# Patient Record
Sex: FEMALE | Race: WHITE | NOT HISPANIC OR LATINO | Employment: FULL TIME | URBAN - NONMETROPOLITAN AREA
[De-identification: names, ages, dates, MRNs, and addresses within clinical notes are randomized per-mention and may not be internally consistent; named-entity substitution may affect disease eponyms.]

---

## 2021-01-19 DIAGNOSIS — D25.9 LEIOMYOMA OF UTERUS, UNSPECIFIED: ICD-10-CM

## 2021-01-19 DIAGNOSIS — N95.0 POSTMENOPAUSAL BLEEDING: ICD-10-CM

## 2021-01-20 ENCOUNTER — HOSPITAL ENCOUNTER (OUTPATIENT)
Dept: ULTRASOUND IMAGING | Facility: HOSPITAL | Age: 66
Discharge: HOME | End: 2021-01-20
Payer: MEDICARE

## 2021-01-20 DIAGNOSIS — N95.0 POSTMENOPAUSAL BLEEDING: ICD-10-CM

## 2021-01-20 PROCEDURE — 76830 TRANSVAGINAL US NON-OB: CPT

## 2021-01-20 PROCEDURE — 76856 US EXAM PELVIC COMPLETE: CPT

## 2021-01-26 ENCOUNTER — HOSPITAL ENCOUNTER (OUTPATIENT)
Dept: MRI IMAGING | Facility: HOSPITAL | Age: 66
Discharge: HOME | End: 2021-01-26
Payer: MEDICARE

## 2021-01-26 DIAGNOSIS — D25.9 LEIOMYOMA OF UTERUS, UNSPECIFIED: ICD-10-CM

## 2021-01-26 PROCEDURE — A9585 GADOBUTROL INJECTION: HCPCS | Performed by: RADIOLOGY

## 2021-01-26 PROCEDURE — 72197 MRI PELVIS W/O & W/DYE: CPT

## 2021-01-26 RX ADMIN — GADOBUTROL 6 ML: 604.72 INJECTION INTRAVENOUS at 10:14

## 2021-02-03 ENCOUNTER — TELEMEDICINE (OUTPATIENT)
Dept: GYNECOLOGIC ONCOLOGY | Facility: CLINIC | Age: 66
End: 2021-02-03
Payer: MEDICARE

## 2021-02-03 DIAGNOSIS — N83.8 MASS, OVARIAN: Primary | ICD-10-CM

## 2021-02-03 PROBLEM — F41.9 ANXIETY: Status: ACTIVE | Noted: 2021-02-03

## 2021-02-03 PROCEDURE — 99204 OFFICE O/P NEW MOD 45 MIN: CPT | Performed by: OBSTETRICS & GYNECOLOGY

## 2021-02-03 RX ORDER — HEPARIN SODIUM 5000 [USP'U]/ML
5000 INJECTION, SOLUTION INTRAVENOUS; SUBCUTANEOUS
Status: CANCELLED | OUTPATIENT
Start: 2021-02-04 | End: 2021-02-05

## 2021-02-03 RX ORDER — CEFAZOLIN SODIUM 1 G/50ML
1000 SOLUTION INTRAVENOUS ONCE
Status: CANCELLED | OUTPATIENT
Start: 2021-02-03 | End: 2021-02-03

## 2021-02-03 RX ORDER — CELECOXIB 200 MG/1
200 CAPSULE ORAL ONCE
Status: CANCELLED | OUTPATIENT
Start: 2021-02-03 | End: 2021-02-03

## 2021-02-03 RX ORDER — ALPRAZOLAM 0.5 MG/1
TABLET ORAL
COMMUNITY

## 2021-02-03 RX ORDER — ACETAMINOPHEN 325 MG/1
975 TABLET ORAL ONCE
Status: CANCELLED | OUTPATIENT
Start: 2021-02-03 | End: 2021-02-03

## 2021-02-03 RX ORDER — ESCITALOPRAM OXALATE 10 MG/1
10 TABLET ORAL DAILY
COMMUNITY

## 2021-02-03 NOTE — LETTER
February 3, 2021     Ruddy Cantor, [address] [place] [address]    Patient: Chasity Norris   YOB: 1955   Date of Visit: 2/3/2021       Dear Dr Yulissa Sebastian: Thank you for referring Chasity Norris to me for evaluation  Below are my notes for this consultation  If you have questions, please do not hesitate to call me  I look forward to following your patient along with you           Sincerely,        Zaida Cooley MD        CC: Lizbeth Gracia MD

## 2021-02-03 NOTE — ASSESSMENT & PLAN NOTE
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, favor fibroma/thecoma  I discussed with patient differential diagnosis including benign, borderline malignant pathology  We discussed potential management options including observation versus definitive surgical treatment  Patient wants to proceed with surgery  I counseled her and recommended proceeding with laparoscopic bilateral salpingo-oophorectomy, possible hysterectomy, laparotomy, staging, tumor debulking and all other indicated procedures  She verbally consented and wants to proceed  Her informed consent forms will be signed as hard copies on the day of the procedure as today's visit was performed via telemedicine  Will obtain preoperative testing as per procedure pass  The patient was counseled regarding indications, risks, benefits and alternatives to surgical management  We discussed risks including but not limited to bleeding and potential need for blood transfusions, infection, pain, injury to surrounding organs such as bladder, intestines, ureters and neurovascular structures  Patient understands potential risks associated with stress of surgery and general anesthesia including but not limited to acute cardiac events, venous thromboembolism, etc   All questions answered to patient's satisfaction  Patient agrees and wants to proceed

## 2021-02-03 NOTE — H&P (VIEW-ONLY)
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, favor fibroma/thecoma  I discussed with patient differential diagnosis including benign, borderline malignant pathology  We discussed potential management options including observation versus definitive surgical treatment  Patient wants to proceed with surgery  I counseled her and recommended proceeding with laparoscopic bilateral salpingo-oophorectomy, possible hysterectomy, laparotomy, staging, tumor debulking and all other indicated procedures  She verbally consented and wants to proceed  Her informed consent forms will be signed as hard copies on the day of the procedure as today's visit was performed via telemedicine  Will obtain preoperative testing as per procedure pass  The patient was counseled regarding indications, risks, benefits and alternatives to surgical management  We discussed risks including but not limited to bleeding and potential need for blood transfusions, infection, pain, injury to surrounding organs such as bladder, intestines, ureters and neurovascular structures  Patient understands potential risks associated with stress of surgery and general anesthesia including but not limited to acute cardiac events, venous thromboembolism, etc   All questions answered to patient's satisfaction  Patient agrees and wants to proceed             Relevant Orders    Case request operating room: LAPAROSCOPIC BILATERAL SALPINGO-OOPHORECTOMY, POSSIBLE HYSTERECTOMY, LAPAROTOMY, STAGING, TUMOR DEBULKING AND ALL OTHER INDICATED PROCEDURES  (Completed)    Basic metabolic panel    CBC and differential    EKG 12 lead    Type and screen                Reason for visit is   Chief Complaint   Patient presents with    Virtual Brief Visit        Encounter provider Trenton Andrade MD    Provider located at [address] [address] [phone]    Recent Visits  No visits were found meeting these conditions  Showing recent visits within past 7 days and meeting all other requirements     Today's Visits  Date Type Provider Dept   21 Telemedicine Trenton Andrade, [address] today's visits and meeting all other requirements     Future Appointments  No visits were found meeting these conditions  Showing future appointments within next 150 days and meeting all other requirements        After connecting through telephone, the patient was identified by name and date of birth  Dread Dumont was informed that this is a telemedicine visit and that the visit is being conducted through telephone  My office door was closed  No one else was in the room  She acknowledged consent and understanding of privacy and security of the platform  The patient has agreed to participate and understands she can discontinue the visit at any time  It was my intent to perform this visit via video technology but the patient was not able to do a video connection so the visit was completed via audio telephone only  Patient is aware this is a billable service  Subjective    Dread Dumont is a 76 y o  female  HPI    14-year-old menopausal female with anxiety disorder, white coat hypertension and insomnia  She reports history of 1 prior  section and remote myomectomy approximately 30 years ago  She was in her usual state of health until 2 weeks ago  At that time, had a single episode of noticing blood clot in the toilet  She doubts this was truly postmenopausal bleeding as she so no blood on toilet paper, no hematuria and no subsequent issues    However, reported this to her primary gynecologic provider will order pelvic ultrasound  This demonstrated a 2 mm thin and normal endometrial stripe  Incidentally, a left pelvic mass measuring approximately 5 cm was identified  This was further evaluated by MRI with imaging characteristics consistent with ovarian neoplastic processes, mostly solid, suggestive of sex cord stromal tumor  I was consulted for evaluation and treatment recommendations  Given patient's concern in interest in having this addressed as soon as possible, we connected today via telemedicine visit  Last mammogram:  1 year ago, reportedly normal     Last colonoscopy: Approximately 10 years ago, reportedly normal     Past Medical History:   Diagnosis Date    Anxiety disorder     Hypertension      reports as "white coat"  hypertension       Past Surgical History:   Procedure Laterality Date     SECTION      MYOMECTOMY         Current Outpatient Medications   Medication Sig Dispense Refill    ALPRAZolam (XANAX) 0 5 mg tablet Take by mouth daily at bedtime as needed for anxiety      escitalopram (LEXAPRO) 10 mg tablet Take 10 mg by mouth daily       No current facility-administered medications for this visit  No Known Allergies    Review of Systems    Denies postmenopausal bleeding  Denies changes in bowel or bladder function  No pain  No other symptoms  Acknowledges significant anxiety  Twelve point review of systems otherwise noncontributory  There were no vitals filed for this visit  2021: MRI pelvis with and without contrast:   Personally reviewed images  The uterus appears slightly enlarged consistent with prior history of fibroids  Endometrial stripe is normal in intensity and thickness  There is an approximately 4-5 cm left adnexal mass in close proximity with the uterus but with clear fat plane  it from it  No separate left ovaries identified    Imaging characteristics suggest mostly solid and  Mostly homogeneous, hypointense on T1 and T2, nonenhancing, consistent with sex cord stromal tumor  I spent 30 minutes directly with the patient during this visit    VIRTUAL VISIT DISCLAIMER    Trae Hills acknowledges that she has consented to an online visit or consultation  She understands that the online visit is based solely on information provided by her, and that, in the absence of a face-to-face physical evaluation by the physician, the diagnosis she receives is both limited and provisional in terms of accuracy and completeness  This is not intended to replace a full medical face-to-face evaluation by the physician  Trae Hills understands and accepts these terms        Yvonne Baker MD, [place], [facility]  2/3/2021  3:43 PM

## 2021-02-03 NOTE — PROGRESS NOTES
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, favor fibroma/thecoma  I discussed with patient differential diagnosis including benign, borderline malignant pathology  We discussed potential management options including observation versus definitive surgical treatment  Patient wants to proceed with surgery  I counseled her and recommended proceeding with laparoscopic bilateral salpingo-oophorectomy, possible hysterectomy, laparotomy, staging, tumor debulking and all other indicated procedures  She verbally consented and wants to proceed  Her informed consent forms will be signed as hard copies on the day of the procedure as today's visit was performed via telemedicine  Will obtain preoperative testing as per procedure pass  The patient was counseled regarding indications, risks, benefits and alternatives to surgical management  We discussed risks including but not limited to bleeding and potential need for blood transfusions, infection, pain, injury to surrounding organs such as bladder, intestines, ureters and neurovascular structures  Patient understands potential risks associated with stress of surgery and general anesthesia including but not limited to acute cardiac events, venous thromboembolism, etc   All questions answered to patient's satisfaction  Patient agrees and wants to proceed             Relevant Orders    Case request operating room: LAPAROSCOPIC BILATERAL SALPINGO-OOPHORECTOMY, POSSIBLE HYSTERECTOMY, LAPAROTOMY, STAGING, TUMOR DEBULKING AND ALL OTHER INDICATED PROCEDURES  (Completed)    Basic metabolic panel    CBC and differential    EKG 12 lead    Type and screen                Reason for visit is   Chief Complaint   Patient presents with    Virtual Brief Visit        Encounter provider Ortiz Rust MD    Provider located at [address] [address] [phone]    Recent Visits  No visits were found meeting these conditions  Showing recent visits within past 7 days and meeting all other requirements     Today's Visits  Date Type Provider Dept   21 Telemedicine Ortiz Rust, [address] today's visits and meeting all other requirements     Future Appointments  No visits were found meeting these conditions  Showing future appointments within next 150 days and meeting all other requirements        After connecting through telephone, the patient was identified by name and date of birth  Popeye Saldana was informed that this is a telemedicine visit and that the visit is being conducted through telephone  My office door was closed  No one else was in the room  She acknowledged consent and understanding of privacy and security of the platform  The patient has agreed to participate and understands she can discontinue the visit at any time  It was my intent to perform this visit via video technology but the patient was not able to do a video connection so the visit was completed via audio telephone only  Patient is aware this is a billable service  Subjective    Popeye Saldana is a 76 y o  female  HPI    68-year-old menopausal female with anxiety disorder, white coat hypertension and insomnia  She reports history of 1 prior  section and remote myomectomy approximately 30 years ago  She was in her usual state of health until 2 weeks ago  At that time, had a single episode of noticing blood clot in the toilet  She doubts this was truly postmenopausal bleeding as she so no blood on toilet paper, no hematuria and no subsequent issues    However, reported this to her primary gynecologic provider will order pelvic ultrasound  This demonstrated a 2 mm thin and normal endometrial stripe  Incidentally, a left pelvic mass measuring approximately 5 cm was identified  This was further evaluated by MRI with imaging characteristics consistent with ovarian neoplastic processes, mostly solid, suggestive of sex cord stromal tumor  I was consulted for evaluation and treatment recommendations  Given patient's concern in interest in having this addressed as soon as possible, we connected today via telemedicine visit  Last mammogram:  1 year ago, reportedly normal     Last colonoscopy: Approximately 10 years ago, reportedly normal     Past Medical History:   Diagnosis Date    Anxiety disorder     Hypertension      reports as "white coat"  hypertension       Past Surgical History:   Procedure Laterality Date     SECTION      MYOMECTOMY         Current Outpatient Medications   Medication Sig Dispense Refill    ALPRAZolam (XANAX) 0 5 mg tablet Take by mouth daily at bedtime as needed for anxiety      escitalopram (LEXAPRO) 10 mg tablet Take 10 mg by mouth daily       No current facility-administered medications for this visit  No Known Allergies    Review of Systems    Denies postmenopausal bleeding  Denies changes in bowel or bladder function  No pain  No other symptoms  Acknowledges significant anxiety  Twelve point review of systems otherwise noncontributory  There were no vitals filed for this visit  2021: MRI pelvis with and without contrast:   Personally reviewed images  The uterus appears slightly enlarged consistent with prior history of fibroids  Endometrial stripe is normal in intensity and thickness  There is an approximately 4-5 cm left adnexal mass in close proximity with the uterus but with clear fat plane  it from it  No separate left ovaries identified    Imaging characteristics suggest mostly solid and  Mostly homogeneous, hypointense on T1 and T2, nonenhancing, consistent with sex cord stromal tumor  I spent 30 minutes directly with the patient during this visit    VIRTUAL VISIT DISCLAIMER    Kirill Esau acknowledges that she has consented to an online visit or consultation  She understands that the online visit is based solely on information provided by her, and that, in the absence of a face-to-face physical evaluation by the physician, the diagnosis she receives is both limited and provisional in terms of accuracy and completeness  This is not intended to replace a full medical face-to-face evaluation by the physician  Kirill Cifuentes understands and accepts these terms        Leila Aviles MD, [place], [facility]  2/3/2021  3:43 PM

## 2021-02-05 ENCOUNTER — OFFICE VISIT (OUTPATIENT)
Dept: LAB | Facility: HOSPITAL | Age: 66
End: 2021-02-05
Attending: OBSTETRICS & GYNECOLOGY
Payer: MEDICARE

## 2021-02-05 DIAGNOSIS — N83.8 MASS, OVARIAN: ICD-10-CM

## 2021-02-05 LAB
ABO GROUP BLD: NORMAL
ANION GAP SERPL CALCULATED.3IONS-SCNC: 5 MMOL/L (ref 4–13)
BASOPHILS # BLD AUTO: 0.04 THOUSANDS/ΜL (ref 0–0.1)
BASOPHILS NFR BLD AUTO: 1 % (ref 0–1)
BLD GP AB SCN SERPL QL: NEGATIVE
BUN SERPL-MCNC: 23 MG/DL (ref 5–25)
CALCIUM SERPL-MCNC: 9 MG/DL (ref 8.3–10.1)
CHLORIDE SERPL-SCNC: 105 MMOL/L (ref 100–108)
CO2 SERPL-SCNC: 31 MMOL/L (ref 21–32)
CREAT SERPL-MCNC: 0.99 MG/DL (ref 0.6–1.3)
EOSINOPHIL # BLD AUTO: 0.11 THOUSAND/ΜL (ref 0–0.61)
EOSINOPHIL NFR BLD AUTO: 2 % (ref 0–6)
ERYTHROCYTE [DISTWIDTH] IN BLOOD BY AUTOMATED COUNT: 12.4 % (ref 11.6–15.1)
GFR SERPL CREATININE-BSD FRML MDRD: 60 ML/MIN/1.73SQ M
GLUCOSE P FAST SERPL-MCNC: 91 MG/DL (ref 65–99)
HCT VFR BLD AUTO: 46.9 % (ref 34.8–46.1)
HGB BLD-MCNC: 15.5 G/DL (ref 11.5–15.4)
IMM GRANULOCYTES # BLD AUTO: 0.01 THOUSAND/UL (ref 0–0.2)
IMM GRANULOCYTES NFR BLD AUTO: 0 % (ref 0–2)
LYMPHOCYTES # BLD AUTO: 1.62 THOUSANDS/ΜL (ref 0.6–4.47)
LYMPHOCYTES NFR BLD AUTO: 25 % (ref 14–44)
MCH RBC QN AUTO: 30.9 PG (ref 26.8–34.3)
MCHC RBC AUTO-ENTMCNC: 33 G/DL (ref 31.4–37.4)
MCV RBC AUTO: 94 FL (ref 82–98)
MONOCYTES # BLD AUTO: 0.45 THOUSAND/ΜL (ref 0.17–1.22)
MONOCYTES NFR BLD AUTO: 7 % (ref 4–12)
NEUTROPHILS # BLD AUTO: 4.21 THOUSANDS/ΜL (ref 1.85–7.62)
NEUTS SEG NFR BLD AUTO: 65 % (ref 43–75)
NRBC BLD AUTO-RTO: 0 /100 WBCS
PLATELET # BLD AUTO: 297 THOUSANDS/UL (ref 149–390)
PMV BLD AUTO: 9.7 FL (ref 8.9–12.7)
POTASSIUM SERPL-SCNC: 3.9 MMOL/L (ref 3.5–5.3)
RBC # BLD AUTO: 5.01 MILLION/UL (ref 3.81–5.12)
RH BLD: POSITIVE
SODIUM SERPL-SCNC: 141 MMOL/L (ref 136–145)
SPECIMEN EXPIRATION DATE: NORMAL
WBC # BLD AUTO: 6.44 THOUSAND/UL (ref 4.31–10.16)

## 2021-02-05 PROCEDURE — 86901 BLOOD TYPING SEROLOGIC RH(D): CPT

## 2021-02-05 PROCEDURE — 86900 BLOOD TYPING SEROLOGIC ABO: CPT

## 2021-02-05 PROCEDURE — 86850 RBC ANTIBODY SCREEN: CPT

## 2021-02-05 PROCEDURE — 36415 COLL VENOUS BLD VENIPUNCTURE: CPT

## 2021-02-05 PROCEDURE — 93005 ELECTROCARDIOGRAM TRACING: CPT

## 2021-02-05 PROCEDURE — 80048 BASIC METABOLIC PNL TOTAL CA: CPT

## 2021-02-05 PROCEDURE — 85025 COMPLETE CBC W/AUTO DIFF WBC: CPT

## 2021-02-08 LAB
ATRIAL RATE: 64 BPM
P AXIS: 82 DEGREES
PR INTERVAL: 148 MS
QRS AXIS: 60 DEGREES
QRSD INTERVAL: 90 MS
QT INTERVAL: 390 MS
QTC INTERVAL: 402 MS
T WAVE AXIS: 61 DEGREES
VENTRICULAR RATE: 64 BPM

## 2021-02-08 PROCEDURE — 93010 ELECTROCARDIOGRAM REPORT: CPT | Performed by: INTERNAL MEDICINE

## 2021-02-09 ENCOUNTER — ANESTHESIA EVENT (OUTPATIENT)
Dept: PERIOP | Facility: HOSPITAL | Age: 66
End: 2021-02-09
Payer: MEDICARE

## 2021-02-09 RX ORDER — MELOXICAM 7.5 MG/1
7.5 TABLET ORAL DAILY
COMMUNITY

## 2021-02-09 NOTE — PRE-PROCEDURE INSTRUCTIONS
Pre-Surgery Instructions:   Medication Instructions    ALPRAZolam (XANAX) 0 5 mg tablet Instructed patient per Anesthesia Guidelines   escitalopram (LEXAPRO) 10 mg tablet Instructed patient per Anesthesia Guidelines   meloxicam (MOBIC) 7 5 mg tablet Instructed patient per Anesthesia Guidelines  Education Index    Med Instructions Troubleshoot   Antidepressant Med Class    Continue to take this medication on your normal schedule  If this is an oral medication and you take it in the morning, then you may take this medicine with a sip of water  Benzodiazepine antagonist Med Class    If this medication is needed please continue to take on your normal schedule  If you take it in the morning, then you may take this medicine with a sip of water  NSAID Med Class    Stop taking this medication at least 3 days prior to surgery/procedure

## 2021-02-10 ENCOUNTER — HOSPITAL ENCOUNTER (OUTPATIENT)
Facility: HOSPITAL | Age: 66
Setting detail: OUTPATIENT SURGERY
Discharge: HOME | End: 2021-02-10
Attending: OBSTETRICS & GYNECOLOGY | Admitting: OBSTETRICS & GYNECOLOGY
Payer: MEDICARE

## 2021-02-10 ENCOUNTER — TELEPHONE (OUTPATIENT)
Dept: HEMATOLOGY ONCOLOGY | Facility: CLINIC | Age: 66
End: 2021-02-10

## 2021-02-10 ENCOUNTER — ANESTHESIA (OUTPATIENT)
Dept: PERIOP | Facility: HOSPITAL | Age: 66
End: 2021-02-10
Payer: MEDICARE

## 2021-02-10 VITALS
DIASTOLIC BLOOD PRESSURE: 68 MMHG | BODY MASS INDEX: 20.83 KG/M2 | TEMPERATURE: 97.8 F | HEIGHT: 65 IN | OXYGEN SATURATION: 99 % | SYSTOLIC BLOOD PRESSURE: 112 MMHG | HEART RATE: 58 BPM | RESPIRATION RATE: 16 BRPM | WEIGHT: 125 LBS

## 2021-02-10 VITALS — HEART RATE: 68 BPM

## 2021-02-10 DIAGNOSIS — Z90.722 S/P BSO (BILATERAL SALPINGO-OOPHORECTOMY): ICD-10-CM

## 2021-02-10 DIAGNOSIS — N83.8 MASS, OVARIAN: Primary | ICD-10-CM

## 2021-02-10 PROCEDURE — 88341 IMHCHEM/IMCYTCHM EA ADD ANTB: CPT | Performed by: PATHOLOGY

## 2021-02-10 PROCEDURE — 88305 TISSUE EXAM BY PATHOLOGIST: CPT | Performed by: PATHOLOGY

## 2021-02-10 PROCEDURE — 88307 TISSUE EXAM BY PATHOLOGIST: CPT | Performed by: PATHOLOGY

## 2021-02-10 PROCEDURE — 88342 IMHCHEM/IMCYTCHM 1ST ANTB: CPT | Performed by: PATHOLOGY

## 2021-02-10 PROCEDURE — 58661 LAPAROSCOPY REMOVE ADNEXA: CPT | Performed by: OBSTETRICS & GYNECOLOGY

## 2021-02-10 PROCEDURE — C9290 INJ, BUPIVACAINE LIPOSOME: HCPCS | Performed by: ANESTHESIOLOGY

## 2021-02-10 PROCEDURE — 58662 LAPAROSCOPY EXCISE LESIONS: CPT | Performed by: OBSTETRICS & GYNECOLOGY

## 2021-02-10 PROCEDURE — 88331 PATH CONSLTJ SURG 1 BLK 1SPC: CPT | Performed by: PATHOLOGY

## 2021-02-10 RX ORDER — DEXAMETHASONE SODIUM PHOSPHATE 4 MG/ML
INJECTION, SOLUTION INTRA-ARTICULAR; INTRALESIONAL; INTRAMUSCULAR; INTRAVENOUS; SOFT TISSUE AS NEEDED
Status: DISCONTINUED | OUTPATIENT
Start: 2021-02-10 | End: 2021-02-10

## 2021-02-10 RX ORDER — FENTANYL CITRATE 50 UG/ML
INJECTION, SOLUTION INTRAMUSCULAR; INTRAVENOUS AS NEEDED
Status: DISCONTINUED | OUTPATIENT
Start: 2021-02-10 | End: 2021-02-10

## 2021-02-10 RX ORDER — MIDAZOLAM HYDROCHLORIDE 2 MG/2ML
INJECTION, SOLUTION INTRAMUSCULAR; INTRAVENOUS AS NEEDED
Status: DISCONTINUED | OUTPATIENT
Start: 2021-02-10 | End: 2021-02-10

## 2021-02-10 RX ORDER — METOCLOPRAMIDE HYDROCHLORIDE 5 MG/ML
10 INJECTION INTRAMUSCULAR; INTRAVENOUS ONCE AS NEEDED
Status: DISCONTINUED | OUTPATIENT
Start: 2021-02-10 | End: 2021-02-10 | Stop reason: HOSPADM

## 2021-02-10 RX ORDER — DEXAMETHASONE SODIUM PHOSPHATE 10 MG/ML
INJECTION, SOLUTION INTRAMUSCULAR; INTRAVENOUS AS NEEDED
Status: DISCONTINUED | OUTPATIENT
Start: 2021-02-10 | End: 2021-02-10

## 2021-02-10 RX ORDER — SODIUM CHLORIDE, SODIUM LACTATE, POTASSIUM CHLORIDE, CALCIUM CHLORIDE 600; 310; 30; 20 MG/100ML; MG/100ML; MG/100ML; MG/100ML
125 INJECTION, SOLUTION INTRAVENOUS CONTINUOUS
Status: DISCONTINUED | OUTPATIENT
Start: 2021-02-10 | End: 2021-02-10 | Stop reason: HOSPADM

## 2021-02-10 RX ORDER — MEPERIDINE HYDROCHLORIDE 25 MG/ML
25 INJECTION INTRAMUSCULAR; INTRAVENOUS; SUBCUTANEOUS ONCE AS NEEDED
Status: DISCONTINUED | OUTPATIENT
Start: 2021-02-10 | End: 2021-02-10 | Stop reason: HOSPADM

## 2021-02-10 RX ORDER — BUPIVACAINE HYDROCHLORIDE 2.5 MG/ML
INJECTION, SOLUTION EPIDURAL; INFILTRATION; INTRACAUDAL AS NEEDED
Status: DISCONTINUED | OUTPATIENT
Start: 2021-02-10 | End: 2021-02-10 | Stop reason: HOSPADM

## 2021-02-10 RX ORDER — ONDANSETRON 2 MG/ML
INJECTION INTRAMUSCULAR; INTRAVENOUS AS NEEDED
Status: DISCONTINUED | OUTPATIENT
Start: 2021-02-10 | End: 2021-02-10

## 2021-02-10 RX ORDER — ACETAMINOPHEN 325 MG/1
975 TABLET ORAL ONCE
Status: COMPLETED | OUTPATIENT
Start: 2021-02-10 | End: 2021-02-10

## 2021-02-10 RX ORDER — LIDOCAINE HYDROCHLORIDE 10 MG/ML
0.5 INJECTION, SOLUTION EPIDURAL; INFILTRATION; INTRACAUDAL; PERINEURAL ONCE AS NEEDED
Status: DISCONTINUED | OUTPATIENT
Start: 2021-02-10 | End: 2021-02-10 | Stop reason: HOSPADM

## 2021-02-10 RX ORDER — HYDROMORPHONE HCL/PF 1 MG/ML
0.5 SYRINGE (ML) INJECTION
Status: DISCONTINUED | OUTPATIENT
Start: 2021-02-10 | End: 2021-02-10 | Stop reason: HOSPADM

## 2021-02-10 RX ORDER — FENTANYL CITRATE/PF 50 MCG/ML
25 SYRINGE (ML) INJECTION
Status: DISCONTINUED | OUTPATIENT
Start: 2021-02-10 | End: 2021-02-10 | Stop reason: HOSPADM

## 2021-02-10 RX ORDER — LIDOCAINE HYDROCHLORIDE 10 MG/ML
INJECTION, SOLUTION EPIDURAL; INFILTRATION; INTRACAUDAL; PERINEURAL AS NEEDED
Status: DISCONTINUED | OUTPATIENT
Start: 2021-02-10 | End: 2021-02-10

## 2021-02-10 RX ORDER — GLYCOPYRROLATE 0.2 MG/ML
INJECTION INTRAMUSCULAR; INTRAVENOUS AS NEEDED
Status: DISCONTINUED | OUTPATIENT
Start: 2021-02-10 | End: 2021-02-10

## 2021-02-10 RX ORDER — CEFAZOLIN SODIUM 1 G/50ML
1000 SOLUTION INTRAVENOUS ONCE
Status: COMPLETED | OUTPATIENT
Start: 2021-02-10 | End: 2021-02-10

## 2021-02-10 RX ORDER — CEFAZOLIN SODIUM 1 G/50ML
SOLUTION INTRAVENOUS AS NEEDED
Status: DISCONTINUED | OUTPATIENT
Start: 2021-02-10 | End: 2021-02-10

## 2021-02-10 RX ORDER — NEOSTIGMINE METHYLSULFATE 1 MG/ML
INJECTION INTRAVENOUS AS NEEDED
Status: DISCONTINUED | OUTPATIENT
Start: 2021-02-10 | End: 2021-02-10

## 2021-02-10 RX ORDER — OXYCODONE HYDROCHLORIDE 5 MG/1
5 TABLET ORAL EVERY 6 HOURS PRN
Qty: 5 TABLET | Refills: 0 | Status: CANCELLED | OUTPATIENT
Start: 2021-02-10 | End: 2021-02-20

## 2021-02-10 RX ORDER — PROPOFOL 10 MG/ML
INJECTION, EMULSION INTRAVENOUS AS NEEDED
Status: DISCONTINUED | OUTPATIENT
Start: 2021-02-10 | End: 2021-02-10

## 2021-02-10 RX ORDER — ROCURONIUM BROMIDE 10 MG/ML
INJECTION, SOLUTION INTRAVENOUS AS NEEDED
Status: DISCONTINUED | OUTPATIENT
Start: 2021-02-10 | End: 2021-02-10

## 2021-02-10 RX ORDER — HEPARIN SODIUM 5000 [USP'U]/ML
5000 INJECTION, SOLUTION INTRAVENOUS; SUBCUTANEOUS
Status: COMPLETED | OUTPATIENT
Start: 2021-02-10 | End: 2021-02-10

## 2021-02-10 RX ORDER — CELECOXIB 200 MG/1
200 CAPSULE ORAL ONCE
Status: COMPLETED | OUTPATIENT
Start: 2021-02-10 | End: 2021-02-10

## 2021-02-10 RX ADMIN — FENTANYL CITRATE 25 MCG: 50 INJECTION INTRAMUSCULAR; INTRAVENOUS at 10:00

## 2021-02-10 RX ADMIN — LIDOCAINE HYDROCHLORIDE 50 MG: 10 INJECTION, SOLUTION EPIDURAL; INFILTRATION; INTRACAUDAL; PERINEURAL at 08:05

## 2021-02-10 RX ADMIN — NEOSTIGMINE METHYLSULFATE 3 MG: 1 INJECTION, SOLUTION INTRAVENOUS at 09:30

## 2021-02-10 RX ADMIN — ONDANSETRON 4 MG: 2 INJECTION INTRAMUSCULAR; INTRAVENOUS at 09:13

## 2021-02-10 RX ADMIN — MIDAZOLAM HYDROCHLORIDE 2 MG: 1 INJECTION, SOLUTION INTRAMUSCULAR; INTRAVENOUS at 07:57

## 2021-02-10 RX ADMIN — SODIUM CHLORIDE, SODIUM LACTATE, POTASSIUM CHLORIDE, AND CALCIUM CHLORIDE 125 ML/HR: .6; .31; .03; .02 INJECTION, SOLUTION INTRAVENOUS at 07:17

## 2021-02-10 RX ADMIN — ROCURONIUM BROMIDE 10 MG: 10 INJECTION, SOLUTION INTRAVENOUS at 08:38

## 2021-02-10 RX ADMIN — FENTANYL CITRATE 25 MCG: 50 INJECTION INTRAMUSCULAR; INTRAVENOUS at 09:53

## 2021-02-10 RX ADMIN — ROCURONIUM BROMIDE 40 MG: 10 INJECTION, SOLUTION INTRAVENOUS at 08:06

## 2021-02-10 RX ADMIN — FENTANYL CITRATE 50 MCG: 50 INJECTION, SOLUTION INTRAMUSCULAR; INTRAVENOUS at 08:05

## 2021-02-10 RX ADMIN — FENTANYL CITRATE 50 MCG: 50 INJECTION, SOLUTION INTRAMUSCULAR; INTRAVENOUS at 09:32

## 2021-02-10 RX ADMIN — CELECOXIB 200 MG: 200 CAPSULE ORAL at 07:15

## 2021-02-10 RX ADMIN — CEFAZOLIN SODIUM 1000 MG: 1 SOLUTION INTRAVENOUS at 07:57

## 2021-02-10 RX ADMIN — HEPARIN SODIUM 5000 UNITS: 5000 INJECTION INTRAVENOUS; SUBCUTANEOUS at 07:17

## 2021-02-10 RX ADMIN — ACETAMINOPHEN 975 MG: 325 TABLET ORAL at 07:15

## 2021-02-10 RX ADMIN — CEFAZOLIN SODIUM 1000 MG: 1 SOLUTION INTRAVENOUS at 07:18

## 2021-02-10 RX ADMIN — PROPOFOL 150 MG: 10 INJECTION, EMULSION INTRAVENOUS at 08:05

## 2021-02-10 RX ADMIN — DEXAMETHASONE SODIUM PHOSPHATE 4 MG: 4 INJECTION, SOLUTION INTRAMUSCULAR; INTRAVENOUS at 08:44

## 2021-02-10 RX ADMIN — FENTANYL CITRATE 25 MCG: 50 INJECTION INTRAMUSCULAR; INTRAVENOUS at 09:48

## 2021-02-10 RX ADMIN — GLYCOPYRROLATE 0.4 MG: 0.2 INJECTION, SOLUTION INTRAMUSCULAR; INTRAVENOUS at 09:30

## 2021-02-10 NOTE — ANESTHESIA PREPROCEDURE EVALUATION
Procedure:  LAPAROSCOPIC BILATERAL SALPINGO-OOPHORECTOMY; FROZEN SECTION (N/A Abdomen)  POSSIBLE HYSTERECTOMY, TUMOR DEBULKING, STAGING AND ALL OTHER INDICATED PROCEDURES  (N/A Abdomen)  POSSIBLE LAPAROTOMY EXPLORATORY (N/A Abdomen)    Relevant Problems   ANESTHESIA (within normal limits)      CARDIO  Borderline HTN, "White coat syndrome"      ENDO (within normal limits)      NEURO/PSYCH   (+) Anxiety      Other   (+) Mass, ovarian        Physical Exam    Airway    Mallampati score: I  TM Distance: >3 FB  Neck ROM: full     Dental   No notable dental hx     Cardiovascular      Pulmonary      Other Findings        Anesthesia Plan  ASA Score- 2     Anesthesia Type- general with ASA Monitors  Additional Monitors:   Airway Plan: ETT  Comment: Surgeon does not expect to need to do a laparotomy  Plan Factors-Exercise tolerance (METS): >4 METS  Chart reviewed  EKG reviewed  Existing labs reviewed  Patient summary reviewed  Patient is not a current smoker  Induction- intravenous  Postoperative Plan-     Informed Consent- Anesthetic plan and risks discussed with patient  I personally reviewed this patient with the CRNA  Discussed and agreed on the Anesthesia Plan with the CRNA  Yazan Cazares

## 2021-02-10 NOTE — OP NOTE
OPERATIVE REPORT  PATIENT NAME: Carlos Hooper    :  1955  MRN: [id number]  Pt Location: OW OR ROOM 01    SURGERY DATE: 2/10/2021    Surgeon(s) and Role:     * Dionicio Barber MD - Primary     * Reena Pitts MD - Assisting    Preop Diagnosis:  Mass, ovarian [N83 8]    Post-Op Diagnosis Codes:     * Mass, ovarian [N83 8]    Procedure(s) (LRB):  LAPAROSCOPIC BILATERAL SALPINGO-OOPHORECTOMY; LAPAROSCOPIC RESECTION OF LEFT BROAD LIGAMENT MASS (MYOMECTOMY) (N/A)    Specimen(s):  ID Type Source Tests Collected by Time Destination   1 : left broad ligament mass Tissue Ligament TISSUE EXAM Dionicio Barber MD 2/10/2021 1668    2 : left ovary and fallopian tube Tissue Ovary, Left TISSUE EXAM Dionicio Barber MD 2/10/2021 0149    3 : right ovary and fallopian tube Tissue Ovary, Right TISSUE EXAM Dionicio Barber MD 2/10/2021 7416        Estimated Blood Loss:   Minimal    Drains:  Urethral Catheter Non-latex 18 Fr  (Active)   Number of days: 0       Anesthesia Type:   General    Operative Indications:  Mass, ovarian [N83 8]    Operative Findings:  1  Small and grossly normal uterus with stenotic cervix and single adhesion  From uterine fundus to distal omentum  2  Grossly normal right fallopian tube and ovary  3  Approximately 5 cm left ovarian mass grossly consistent with benign spindle cell/stroma lesion  Frozen section confirmatory  4  Approximately 3-4 cm left broad ligament ovoid mass consistent with leiomyoma  Complications:   None    Procedure and Technique:  The patient was taken to the operating room where general endotracheal anesthesia was induced without complications  Sequential compression devices were activated and medical deep vein thrombosis prophylaxis was administered per hospital protocol  The patient was placed in the dorsal lithotomy position using Olivier stirrups and her abdomen, perineum and vagina were prepped and draped in the usual sterile fashion       A sponge stick was placed in the vagina  A James catheter was inserted  Attention was turned to the abdomen  A 10 mm skin incision was made at the base of the umbilicus with an 11 blade knife  Please note that this and all other incisions were infiltrated with 0 25% bupivacaine at the beginning and completion of the procedure  Using a 5 mm Endopath Excel trocar and a 5 mm laparoscope the peritoneal cavity was entered under direct visualization  Good intraperitoneal location was confirmed and pneumoperitoneum was created to maximal pressure of 15 mmHg  The patient was placed in steep Trendelenburg and the above-mentioned findings were noted  Two additional 5mm trocars were placed in a similar fashion in the right and left lower quadrants under direct visualization without incident and the umbilical trocar site was upsized to an 11 mm cannula  The ureters were clearly identified on both sides  Each adnexa was elevated and the utero-ovarian ligaments and fallopian tubes were cauterized and transected at the cornual regions  Then, the anterior leaf of broad ligament was divided and the ovarian vessels were elevated above the level of the pelvic brim  The medial leaf of the broad ligament was then divided with above the level of the ureter and the ovarian vessels were cauterized and divided on both sides at the level of the pelvic brim  The tubes and ovaries were placed temporally in the posterior cul-de-sac  Attention was turned to the left broad ligament lesion  The broad ligament was opened further to expose this lesion in its entirety while allowing to maintain direct visualization the course of the ureter was approximately 1 cm below this area  The mass was grabbed with a 5 mm laparoscopic tenaculum and kept under traction  This exposed feeding pedicle which was divided with the EnSeal device      At this point an approximately 5 cm suprapubic mini-laparotomy was created without difficulty and all specimens were delivered using a nylon bag  Then, the fascia at this site was closed using a running stitch of 2-0 Vicryl and the fascia at this site and all trocar sites was liberally infiltrated with 0 25% bupivacaine  Copious irrigation was used and hemostasis was confirmed at low intraperitoneal pressures  All trocars were removed and pneumoperitoneum was completely released with the assistance of Valsalva maneuvers  The skin at all port sites was closed using a subcuticular stitch of 4-0 Monocryl and Exofin was applied  The James catheter was removed  The patient tolerated the procedure well, sponge, lap, needle and instrument counts were all reported as correct ×2  The patient remained stable throughout the procedure  At the time of this dictation she is awaiting extubation planned to be transferred to the postanesthetic care unit        I was present for the entire procedure    Patient Disposition:  PACU     SIGNATURE: Oscar Gandhi MD, Yoan Dutta  DATE: February 10, 2021  TIME: 9:24 AM

## 2021-02-10 NOTE — TELEPHONE ENCOUNTER
Karen Cleverly called into the specialty line  She had a procedure earlier in the day with Dr Hanh Magana  She is looking for a return to work note for Friday and was told to call for a post op  She has never  been to the office  Is this something you can help with?

## 2021-02-10 NOTE — INTERVAL H&P NOTE
H&P reviewed  After examining the patient I find no changes in the patients condition since the H&P was written  Ht 5' 5" (1 651 m)   Wt 56 7 kg (125 lb)   BMI 20 80 kg/m²     Physical Exam:  General:  Appears anxious in relation to upcoming procedure but pleasant, nontoxic, well-hydrated  Respiratory:  No respiratory distress  Normal chest expansion  Lungs are clear to auscultation bilaterally with no wheezing, rales or rhonchi  Cardiovascular:  Regular rate and rhythm  Normal S1  Normal S2  No murmurs, rubs or gallop  Abdomen:  Soft, nontender, nondistended  I have personally reviewed results of preoperative testing  Today I counseled patient again about indications, risks, benefits and alternatives of surgical exploration  She has consented for laparoscopic bilateral salpingo-oophorectomy with possible hysterectomy, laparotomy, staging, tumor debulking and all other indicated procedures  Procedure plan in detail reviewed and all questions answered  I discussed with patient indication, risks, benefits and alternatives of surgical exploration  We discussed possibility of bleeding requiring blood transfusion, life-threatening infections requiring additional procedures, injuries to surrounding organs such as bladder, ureters, gastrointestinal tract and or neurovascular structures  Additionally, we discussed general risks associated to stress of surgery such as venous thromboembolism, acute myocardial events and stroke  She had previously given verbal consent during tell health visit  Today, she did sign informed consent which will be again scanned into patient's electronic medical record      Fide Sheffield MD, [place], [facility]  2/10/2021  6:44 AM

## 2021-02-10 NOTE — ANESTHESIA POSTPROCEDURE EVALUATION
Post-Op Assessment Note    CV Status:  Stable    Pain management: adequate     Mental Status:  Alert and awake   Hydration Status:  Euvolemic   PONV Controlled:  Controlled   Airway Patency:  Patent      Post Op Vitals Reviewed: Yes      Staff: CRNA         No complications documented      BP     Temp      Pulse     Resp      SpO2

## 2021-02-24 ENCOUNTER — OFFICE VISIT (OUTPATIENT)
Dept: GYNECOLOGIC ONCOLOGY | Facility: CLINIC | Age: 66
End: 2021-02-24

## 2021-02-24 VITALS
SYSTOLIC BLOOD PRESSURE: 138 MMHG | TEMPERATURE: 98.1 F | WEIGHT: 126 LBS | BODY MASS INDEX: 20.99 KG/M2 | HEIGHT: 65 IN | DIASTOLIC BLOOD PRESSURE: 72 MMHG

## 2021-02-24 DIAGNOSIS — Z90.722 S/P BSO (BILATERAL SALPINGO-OOPHORECTOMY): Primary | ICD-10-CM

## 2021-02-24 PROBLEM — N83.8 MASS, OVARIAN: Status: RESOLVED | Noted: 2021-02-03 | Resolved: 2021-02-24

## 2021-02-24 PROCEDURE — 1124F ACP DISCUSS-NO DSCNMKR DOCD: CPT | Performed by: OBSTETRICS & GYNECOLOGY

## 2021-02-24 PROCEDURE — 99024 POSTOP FOLLOW-UP VISIT: CPT | Performed by: OBSTETRICS & GYNECOLOGY

## 2021-02-24 NOTE — LETTER
February 24, 2021     Hakeem Miller, [address] [address] [place]    Patient: Alejandra Briggs   YOB: 1955   Date of Visit: 2/24/2021       Dear Reol Sayer:    Thank you for referring Alejandra Briggs to me for evaluation  Below are my notes for this consultation  If you have questions, please do not hesitate to call me  I look forward to following your patient along with you  Sincerely,        Ermelinda Millard MD        CC: MD Ermelinda Fong MD  2/24/2021  2:17 PM  Sign when Signing Visit  Assessment/Plan:    Problem List Items Addressed This Visit        Other    S/P BSO (bilateral salpingo-oophorectomy) - Primary       Patient is now 2 weeks out from laparoscopic bilateral salpingo-oophorectomy and resection of broad ligament mass/ leiomyoma  Final pathology as expected benign and consistent with ovarian fibroma/ benign leiomyoma  Patient has recovered well from surgery  She denies significant pain or other symptoms  Final pathology discussed  All questions answered  I have recommended avoidance of heavy lifting for 2-4 additional weeks to prevent hernias  She will return to [facility] for [facility]necologic care  I will remain available if she has any questions or problems related to surgery  CHIEF COMPLAINT:     Postoperative follow-up  Patient ID: Alejandra Briggs is a 76 y o  female  HPI    Patient presents for postoperative follow-up  On February 10, 2021 underwent laparoscopic bilateral salpingo-oophorectomy with resection of left broad ligament mass/ leiomyoma  Final pathology consistent with benign ovarian fibroma/broad ligament leiomyoma  She has recovered well from surgery  Denies vaginal bleeding, drainage or discharge  Reports normal bowel and bladder function  Denies incisional pain  She states she was able to resume most activities of daily living immediately after surgery    The following portions of the patient's history were reviewed and updated as appropriate: allergies, current medications, past family history, past medical history, past social history, past surgical history and problem list     Review of Systems    As per HPI  Twelve point review of systems otherwise noncontributory  Current Outpatient Medications   Medication Sig Dispense Refill    ALPRAZolam (XANAX) 0 5 mg tablet Take by mouth daily at bedtime as needed for anxiety      escitalopram (LEXAPRO) 10 mg tablet Take 10 mg by mouth daily      meloxicam (MOBIC) 7 5 mg tablet Take 7 5 mg by mouth daily       No current facility-administered medications for this visit  Objective:    Blood pressure 138/72, temperature 98 1 °F (36 7 °C), temperature source Tympanic, height 5' 5" (1 651 m), weight 57 2 kg (126 lb)  Body mass index is 20 97 kg/m²  Body surface area is 1 63 meters squared  Physical Exam    Abdomen: Incisions well-healed  No hernias  Surgical Pathology Report                         Case: [id number]                                    Authorizing Provider: Cedric Rayo MD      Collected:           02/10/2021 0828               Ordering Location:     [facility]       Received:            02/10/2021 0904                                      Operating Room                                                                Pathologist:           Candida Whitaker MD                                                    Intraop:               Aretha Carlton,                                                       Specimens:   A) - Ligament, left broad ligament mass                                                              B) - Ovary, Left, left ovary and fallopian tube                                                      C) - Ovary, Right, right ovary and fallopian tube                                          Final Diagnosis   A  Left broad ligament mass:  - Leiomyoma  See comment       B   Left ovary and fallopian tube:  - Ovarian fibroma  See comment  - Benign fallopian tube       C  Right ovary and fallopian tube:  - Benign ovary and fallopian tube with paratubal cysts      Comment: Immunohistochemistry on parts A and B for WT-1 and desmin demonstrates positive desmin and weak WT-1 in part A, and strong WT-1 and negative desmin in part B  The findings are consistent with the diagnosis  Electronically signed by Herbert Barton MD on 2/15/2021 at  9:42 AM   Comments: This is an appended report  These results have been appended to a previously preliminary verified report  Additional Information  [facility]   All reported additional testing was performed with appropriately reactive controls   These tests were developed and their performance characteristics determined by [address] [place] [address] [facility] or appropriate performing facility, though some tests may be performed on tissues which have not been validated for performance characteristics (such as staining performed on alcohol exposed cell blocks and decalcified tissues)   Results should be interpreted with caution and in the context of the patients clinical condition  These tests may not be cleared or approved by the [place]  Food and Drug Administration, though the FDA has determined that such clearance or approval is not necessary  These tests are used for clinical purposes and they should not be regarded as investigational or for research  This laboratory has been approved by CLIA 88, designated as a high-complexity laboratory and is qualified to perform these tests     Interpretation performed at [facility], [address]   Comment: This is an appended report  These results have been appended to a previously preliminary verified report  Intraoperative Consultation  [facility]      BF1   Ovary and Fallopian Tube, Left, Salpingo-oophorectomy: Spindle cell neoplasm, favor sex cord-stromal tumor (fibroma/thecoma)      Dr Kristin Hinton, 9:20 am  Interpretation performed at [facility], [address], [address]      Gross Description  [facility]      A  The specimen is received in formalin, labeled with the patient's name and hospital number, and is designated "left broad ligament mass"  The specimen consists of 1 well-defined ovoid white-tan pink dense nodule which measures 2 2 x 1 5 x 1 3 cm and weighs 2 gm  The specimen is inked black  The specimen is sectioned revealing white tan homogeneous dense whorled cut surfaces  Representative sections  One cassette         B  The specimen is received fresh for frozen section, labeled with the patient's name and hospital number, and is designated "left ovary and fallopian tube"  The specimen consists of a dilated ovary  with attached fallopian tube   The ovary exhibits a white-tan pink, smooth and focally cerebriform outer surface  The surface is inked blue  The ovary is sectioned, revealing a dense well-defined nodule which exhibits white tan dense homogeneous cut surfaces  A representative section of the ovarian mass is submitted for frozen section analysis  There is a scant amount of attached unremarkable ovarian tissue  The attached fallopian tube with fimbriated end has a length of 4 2 cm and ranges from 0 3 cm to 0 5 cm in diameter and is unremarkable  Representative sections  Six cassettes      1: Frozen section ovarian mass  2-5:  Additional sections of ovarian mass, with adjacent unremarkable parenchyma in cassette 5  6:  Fallopian tube           C  The specimen is received in formalin, labeled with the patient's name and hospital number, and is designated "right ovary and fallopian tube"  The specimen consists of a white-tan ovary with a cerebriform outer surface, which measures 2 3 x 1 1 x 0 9 cm  The ovary is sectioned revealing unremarkable cut surfaces    The attached fallopian tube with fimbriated end has a length of 4 2 cm and a diameter of 0 4 cm, and exhibits 1 adherent smooth lined clear fluid filled cystic structure measuring 0 2 cm in greatest dimension, and 1 hemorrhagic cystic structure measuring 0 5 cm in greatest dimension  Representative sections  Three cassettes      1, 2:  Ovary, entire    3:  Fallopian tube with cystic structures     Note: The estimated total formalin fixation time based upon information provided by the submitting clinician and the standard processing schedule is under 72 hours  [place]ino      Comment: This is an appended report  These results have been appended to a previously preliminary verified report  Clinical Information Please put formalin in, we are in OR #1  [facility]   Comment: This is an appended report  These results have been appended to a previously preliminary verified report           Specimen Collected: 02/10/21  8:28 AM   Last Resulted: 02/15/21  9:42 AM        Yvonne Baker MD, FACOG, FACS  2/24/2021  2:17 PM

## 2021-02-24 NOTE — PROGRESS NOTES
Assessment/Plan:    Problem List Items Addressed This Visit        Other    S/P BSO (bilateral salpingo-oophorectomy) - Primary       Patient is now 2 weeks out from laparoscopic bilateral salpingo-oophorectomy and resection of broad ligament mass/ leiomyoma  Final pathology as expected benign and consistent with ovarian fibroma/ benign leiomyoma  Patient has recovered well from surgery  She denies significant pain or other symptoms  Final pathology discussed  All questions answered  I have recommended avoidance of heavy lifting for 2-4 additional weeks to prevent hernias  [facility] will [facility] care  I will remain available if she has any questions or problems related to surgery  CHIEF COMPLAINT:     Postoperative follow-up  Patient ID: Tre Ellis is a 76 y o  female  [facility]    Patient presents for postoperative follow-up  On February 10, 2021 underwent laparoscopic bilateral salpingo-oophorectomy with resection of left broad ligament mass/ leiomyoma  Final pathology consistent with benign ovarian fibroma/broad ligament leiomyoma  She has recovered well from surgery  Denies vaginal bleeding, drainage or discharge  Reports normal bowel and bladder function  Denies incisional pain  She states she was able to resume most activities of daily living immediately after surgery  The following portions of the patient's history were reviewed and updated as appropriate: allergies, current medications, past family history, past medical history, past social history, past surgical history and problem list     Review of Systems    As per HPI  Twelve point review of systems otherwise noncontributory    Current Outpatient Medications   Medication Sig Dispense Refill    ALPRAZolam (XANAX) 0 5 mg tablet Take by mouth daily at bedtime as needed for anxiety      escitalopram (LEXAPRO) 10 mg tablet Take 10 mg by mouth daily      meloxicam (MOBIC) 7 5 mg tablet Take 7 5 mg by mouth daily       No current facility-administered medications for this visit  Objective:    Blood pressure 138/72, temperature 98 1 °F (36 7 °C), temperature source Tympanic, height 5' 5" (1 651 m), weight 57 2 kg (126 lb)  Body mass index is 20 97 kg/m²  Body surface area is 1 63 meters squared  Physical Exam    Abdomen: Incisions well-healed  No hernias  Surgical Pathology Report                         Case: [id number]                                    Authorizing Provider: Ermelinda Millard MD      Collected:           02/10/2021 0828               Ordering Location:     [facility]       Received:            02/10/2021 0904                                      Operating Room                                                                Pathologist:           Mason Guerrier MD                                                    Intraop:               Hanny Franco DO                                                      Specimens:   A) - Ligament, left broad ligament mass                                                              B) - Ovary, Left, left ovary and fallopian tube                                                      C) - Ovary, Right, right ovary and fallopian tube                                          Final Diagnosis   A  Left broad ligament mass:  - Leiomyoma  See comment       B  Left ovary and fallopian tube:  - Ovarian fibroma  See comment  - Benign fallopian tube       C  Right ovary and fallopian tube:  - Benign ovary and fallopian tube with paratubal cysts      Comment: Immunohistochemistry on parts A and B for WT-1 and desmin demonstrates positive desmin and weak WT-1 in part A, and strong WT-1 and negative desmin in part B  The findings are consistent with the diagnosis  Electronically signed by Mason Guerrier MD on 2/15/2021 at  9:42 AM   Comments: This is an appended report   These results have been appended to a previously preliminary verified report  Additional Information  [facility]   All reported additional testing was performed with appropriately reactive controls   These tests were developed and their performance characteristics determined by [facility] or appropriate performing facility, though some tests may be performed on tissues which have not been validated for performance characteristics (such as staining performed on alcohol exposed cell blocks and decalcified tissues)   Results should be interpreted with caution and in the context of the patients clinical condition  These tests may not be cleared or approved by the [place]  Food and Drug Administration, though the FDA has determined that such clearance or approval is not necessary  These tests are used for clinical purposes and they should not be regarded as investigational or for research  This laboratory has been approved by [facility], designated as a high-complexity laboratory and is qualified to perform these tests     Interpretation performed at [facility], [address]   Comment: This is an appended report  These results have been appended to a previously preliminary verified report  Intraoperative Consultation  [facility]      BF1  Ovary and Fallopian Tube, Left, Salpingo-oophorectomy: Spindle cell neoplasm, favor sex cord-stromal tumor (fibroma/thecoma)      Dr Corey Griggs, 9:20 am  Interpretation performed at [facility], [address], [address]      Gross Description  [facility]      A  The specimen is received in formalin, labeled with the patient's name and hospital number, and is designated "left broad ligament mass"  The specimen consists of 1 well-defined ovoid white-tan pink dense nodule which measures 2 2 x 1 5 x 1 3 cm and weighs 2 gm  The specimen is inked black    The specimen is sectioned revealing white tan homogeneous dense whorled cut surfaces  Representative sections  One cassette         B  The specimen is received fresh for frozen section, labeled with the patient's name and hospital number, and is designated "left ovary and fallopian tube"  The specimen consists of a dilated ovary  with attached fallopian tube   The ovary exhibits a white-tan pink, smooth and focally cerebriform outer surface  The surface is inked blue  The ovary is sectioned, revealing a dense well-defined nodule which exhibits white tan dense homogeneous cut surfaces  A representative section of the ovarian mass is submitted for frozen section analysis  There is a scant amount of attached unremarkable ovarian tissue  The attached fallopian tube with fimbriated end has a length of 4 2 cm and ranges from 0 3 cm to 0 5 cm in diameter and is unremarkable  Representative sections  Six cassettes      1: Frozen section ovarian mass  2-5:  Additional sections of ovarian mass, with adjacent unremarkable parenchyma in cassette 5  6:  Fallopian tube           C  The specimen is received in formalin, labeled with the patient's name and hospital number, and is designated "right ovary and fallopian tube"  The specimen consists of a white-tan ovary with a cerebriform outer surface, which measures 2 3 x 1 1 x 0 9 cm  The ovary is sectioned revealing unremarkable cut surfaces  The attached fallopian tube with fimbriated end has a length of 4 2 cm and a diameter of 0 4 cm, and exhibits 1 adherent smooth lined clear fluid filled cystic structure measuring 0 2 cm in greatest dimension, and 1 hemorrhagic cystic structure measuring 0 5 cm in greatest dimension  Representative sections  Three cassettes      1, 2:  Ovary, entire    3:  Fallopian tube with cystic structures     Note: The estimated total formalin fixation time based upon information provided by the submitting clinician and the standard processing schedule is under 72 hours  [place]      Comment:  This is an appended report  These results have been appended to a previously preliminary verified report  Clinical Information Please put formalin in, we are in OR #1  [facility]   Comment: This is an appended report  These results have been appended to a previously preliminary verified report           Specimen Collected: 02/10/21  8:28 AM   Last Resulted: 02/15/21  9:42 AM        Trenton Andrade MD, FACOG, FACS  2/24/2021  2:17 PM

## 2021-02-24 NOTE — ASSESSMENT & PLAN NOTE
Patient is now 2 weeks out from laparoscopic bilateral salpingo-oophorectomy and resection of broad ligament mass/ leiomyoma  Final pathology as expected benign and consistent with ovarian fibroma/ benign leiomyoma  Patient has recovered well from surgery  She denies significant pain or other symptoms  Final pathology discussed  All questions answered  I have recommended avoidance of heavy lifting for 2-4 additional weeks to prevent hernias  She will return to [facility] for routine gynecologic care  I will remain available if she has any questions or problems related to surgery

## 2023-06-08 ENCOUNTER — HOSPITAL ENCOUNTER (EMERGENCY)
Facility: HOSPITAL | Age: 68
Discharge: HOME | End: 2023-06-08
Attending: EMERGENCY MEDICINE
Payer: MEDICARE

## 2023-06-08 DIAGNOSIS — S61.412A LACERATION OF LEFT HAND WITHOUT FOREIGN BODY, INITIAL ENCOUNTER: Primary | ICD-10-CM

## 2023-06-08 RX ORDER — LISINOPRIL 5 MG/1
5 TABLET ORAL DAILY
COMMUNITY
Start: 2023-03-24

## 2023-06-08 RX ORDER — BACITRACIN, NEOMYCIN, POLYMYXIN B 400; 3.5; 5 [USP'U]/G; MG/G; [USP'U]/G
1 OINTMENT TOPICAL ONCE
Status: COMPLETED | OUTPATIENT
Start: 2023-06-08 | End: 2023-06-08

## 2023-06-08 RX ORDER — CEPHALEXIN 500 MG/1
500 CAPSULE ORAL EVERY 6 HOURS SCHEDULED
Qty: 20 CAPSULE | Refills: 0 | Status: SHIPPED | OUTPATIENT
Start: 2023-06-08 | End: 2023-06-13

## 2023-06-08 RX ORDER — CEPHALEXIN 250 MG/1
500 CAPSULE ORAL ONCE
Status: COMPLETED | OUTPATIENT
Start: 2023-06-08 | End: 2023-06-08

## 2023-06-08 RX ADMIN — CEPHALEXIN 500 MG: 250 CAPSULE ORAL at 14:23

## 2023-06-08 RX ADMIN — BACITRACIN ZINC, NEOMYCIN, POLYMYXIN B 1 LARGE APPLICATION: 400; 3.5; 5 OINTMENT TOPICAL at 14:23

## 2023-06-08 NOTE — ED PROVIDER NOTES
"History  Chief Complaint   Patient presents with   • Laceration     Patient has laceration on left hand  Laceration to left hand on Horse leash  Tetanus up-to-date  No other complaints  History provided by:  Patient   used: No    Laceration  Location: Left hand  Length:  6 cm  Depth: Through dermis  Quality: jagged    Bleeding: venous    Time since incident: Just prior to arrival   Laceration mechanism:  Blunt object  Pain details:     Quality:  Aching    Severity:  Mild    Timing:  Constant    Progression:  Unchanged  Relieved by:  Pressure  Worsened by: Movement  Ineffective treatments:  None tried  Tetanus status:  Up to date  Associated symptoms: no fever, no rash, no swelling and no streaking        Prior to Admission Medications   Prescriptions Last Dose Informant Patient Reported? Taking? ALPRAZolam (XANAX) 0 5 mg tablet  Self Yes Yes   Sig: Take by mouth daily at bedtime as needed for anxiety   escitalopram (LEXAPRO) 10 mg tablet Not Taking Self Yes No   Sig: Take 10 mg by mouth daily   Patient not taking: Reported on 2023   lisinopril (ZESTRIL) 5 mg tablet   Yes Yes   Sig: Take 5 mg by mouth daily   meloxicam (MOBIC) 7 5 mg tablet Not Taking  Yes No   Sig: Take 7 5 mg by mouth daily   Patient not taking: Reported on 2023      Facility-Administered Medications: None       Past Medical History:   Diagnosis Date   • Anxiety    • Anxiety disorder    • Hypertension      reports as \"white coat\"  hypertension   • Ovarian mass, left    • Wears contact lenses        Past Surgical History:   Procedure Laterality Date   •  SECTION     • COLONOSCOPY     • MYOMECTOMY     • CT SALPINGO-OOPHORECTOMY COMPL/PRTL UNI/BI SPX N/A 2/10/2021    Procedure: LAPAROSCOPIC BILATERAL SALPINGO-OOPHORECTOMY; LAPAROSCOPIC RESECTION OF LEFT BROAD LIGAMENT MASS (MYOMECTOMY);   Surgeon: Familia Butt MD;  Location:  MAIN OR;  Service: Gynecology Oncology       Family History " Problem Relation Age of Onset   • Diabetes Mother    • Heart disease Father    • Hypertension Sister    • Hyperlipidemia Brother    • Hypertension Brother    • Diabetes Brother      I have reviewed and agree with the history as documented  E-Cigarette/Vaping   • E-Cigarette Use Never User      E-Cigarette/Vaping Substances   • Nicotine No    • THC No    • CBD No    • Flavoring No    • Other No    • Unknown No      Social History     Tobacco Use   • Smoking status: Never   • Smokeless tobacco: Never   Vaping Use   • Vaping Use: Never used   Substance Use Topics   • Alcohol use: Never   • Drug use: Never       Review of Systems   Constitutional: Negative for chills and fever  HENT: Negative for ear pain, hearing loss, sore throat, trouble swallowing and voice change  Eyes: Negative for pain and discharge  Respiratory: Negative for cough, shortness of breath and wheezing  Cardiovascular: Negative for chest pain and palpitations  Gastrointestinal: Negative for abdominal pain, blood in stool, constipation, diarrhea, nausea and vomiting  Genitourinary: Negative for dysuria, flank pain, frequency and hematuria  Musculoskeletal: Negative for joint swelling, neck pain and neck stiffness  Skin: Negative for rash and wound  Neurological: Negative for dizziness, seizures, syncope, facial asymmetry and headaches  Psychiatric/Behavioral: Negative for hallucinations, self-injury and suicidal ideas  All other systems reviewed and are negative  Physical Exam  Physical Exam  Constitutional:       General: She is not in acute distress  Appearance: Normal appearance  She is not ill-appearing  HENT:      Head: Normocephalic and atraumatic  Right Ear: External ear normal       Left Ear: External ear normal       Nose: Nose normal       Mouth/Throat:      Mouth: Mucous membranes are moist    Eyes:      Extraocular Movements: Extraocular movements intact        Pupils: Pupils are equal, round, and reactive to light  Cardiovascular:      Rate and Rhythm: Normal rate and regular rhythm  Pulmonary:      Effort: Pulmonary effort is normal  No respiratory distress  Breath sounds: Normal breath sounds  Abdominal:      General: Abdomen is flat  Bowel sounds are normal  There is no distension  Palpations: Abdomen is soft  Tenderness: There is no abdominal tenderness  Musculoskeletal:         General: No swelling or tenderness  Cervical back: Normal range of motion and neck supple  Comments: There is a 6 cm laceration starting at the proximal phalanx of the left thumb medially down to the base of the thumb and thenar region  Thumb has full range of motion  Neurovascular intact distally  Skin:     General: Skin is warm and dry  Capillary Refill: Capillary refill takes less than 2 seconds  Neurological:      General: No focal deficit present  Mental Status: She is alert and oriented to person, place, and time  Psychiatric:         Mood and Affect: Mood normal          Behavior: Behavior normal          Vital Signs  ED Triage Vitals   Temp Pulse Resp BP SpO2   -- -- -- -- --      Temp src Heart Rate Source Patient Position - Orthostatic VS BP Location FiO2 (%)   -- -- -- -- --      Pain Score       --           There were no vitals filed for this visit  Visual Acuity      ED Medications  Medications   cephalexin (KEFLEX) capsule 500 mg (has no administration in time range)   neomycin-bacitracin-polymyxin b (NEOSPORIN) ointment 1 large application (has no administration in time range)       Diagnostic Studies  Results Reviewed     None                 No orders to display              Procedures  Universal Protocol:  Consent: Verbal consent obtained    Consent given by: patient  Patient identity confirmed: verbally with patient    Laceration repair    Date/Time: 6/8/2023 2:18 PM    Performed by: Romie Henley MD  Authorized by: Romie Henley MD  Location: Left hand  Laceration length: 6 cm  Tendon involvement: none  Nerve involvement: none  Anesthesia: local infiltration    Anesthesia:  Local Anesthetic: lidocaine 1% without epinephrine  Anesthetic total: 7 mL    Sedation:  Patient sedated: no      Wound Dehiscence:  Superficial Wound Dehiscence: simple closure      Procedure Details:  Irrigation solution: saline  Irrigation method: syringe  Amount of cleaning: standard  Debridement: minimal  Degree of undermining: none  Skin closure: 4-0 nylon  Number of sutures: 10  Technique: simple and horizontal mattress  Approximation: close  Approximation difficulty: simple  Dressing: 4x4 sterile gauze and antibiotic ointment  Patient tolerance: patient tolerated the procedure well with no immediate complications               ED Course                                             MDM    Disposition  Final diagnoses:   Laceration of left hand without foreign body, initial encounter     Time reflects when diagnosis was documented in both MDM as applicable and the Disposition within this note     Time User Action Codes Description Comment    6/8/2023  2:15 PM Cassi Martinez Add [[id number]] Laceration of left hand without foreign body, initial encounter       ED Disposition     ED Disposition   Discharge    Condition   Stable    Date/Time   Thu Jun 8, 2023  2:15 PM    Sapphire Perales discharge to home/self care  Follow-up Information     Follow up With Specialties Details Why Contact Info    Fariba Christian MD Internal Medicine  For suture removal in 10 to 14 days [facility]  [phone]            Patient's Medications   Discharge Prescriptions    CEPHALEXIN (KEFLEX) 500 MG CAPSULE    Take 1 capsule (500 mg total) by mouth every 6 (six) hours for 5 days       Start Date: 6/8/2023  End Date: 6/13/2023       Order Dose: 500 mg       Quantity: 20 capsule    Refills: 0       No discharge procedures on file      PDMP Review Value Time User    PDMP Reviewed  Yes 2/10/2021  9:31 AM Meena Medley MD          ED Provider  Electronically Signed by           Romie Henley MD  06/08/23 [id number]

## 2024-11-29 ENCOUNTER — TELEPHONE (OUTPATIENT)
Age: 69
End: 2024-11-29

## 2024-11-29 NOTE — TELEPHONE ENCOUNTER
Return call placed. No answer; mailbox is full and cannot accept message.     Patient has not been seen in the office since February 2021.

## 2024-11-29 NOTE — TELEPHONE ENCOUNTER
Dr. Davis is asking for a call from Dr. Goins to discuss the patients care. He would like a call back at [phone]

## 2025-04-21 ENCOUNTER — TELEPHONE (OUTPATIENT)
Dept: GYNECOLOGIC ONCOLOGY | Facility: CLINIC | Age: 70
End: 2025-04-21

## 2025-04-21 NOTE — TELEPHONE ENCOUNTER
Called patient and left a voicemail to schedule an appointment with Dr. Goins. Patient last saw him in 2021 so this would need to be a new patient appointment. He does have an opening tomorrow morning in [place] at 9:00 am. [place] number was left for patient to call back.  
Patient returned call to office. 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 Patient scheduled for 4/29 at 11:15 with Dr. Goins in the [place] office. Provided office details to patient.   
No

## 2025-04-29 ENCOUNTER — CONSULT (OUTPATIENT)
Dept: GYNECOLOGIC ONCOLOGY | Facility: CLINIC | Age: 70
End: 2025-04-29
Payer: MEDICARE

## 2025-04-29 DIAGNOSIS — D14.30: Primary | ICD-10-CM

## 2025-04-29 DIAGNOSIS — D25.1 INTRAMURAL, SUBMUCOUS, AND SUBSEROUS LEIOMYOMA OF UTERUS: ICD-10-CM

## 2025-04-29 DIAGNOSIS — D25.2 INTRAMURAL, SUBMUCOUS, AND SUBSEROUS LEIOMYOMA OF UTERUS: ICD-10-CM

## 2025-04-29 DIAGNOSIS — D25.0 INTRAMURAL, SUBMUCOUS, AND SUBSEROUS LEIOMYOMA OF UTERUS: ICD-10-CM

## 2025-04-29 PROBLEM — Z90.722 S/P BSO (BILATERAL SALPINGO-OOPHORECTOMY): Status: RESOLVED | Noted: 2021-02-10 | Resolved: 2025-04-29

## 2025-04-29 PROCEDURE — 99203 OFFICE O/P NEW LOW 30 MIN: CPT | Performed by: OBSTETRICS & GYNECOLOGY

## 2025-04-29 RX ORDER — LOSARTAN POTASSIUM 25 MG/1
25 TABLET ORAL DAILY
COMMUNITY

## 2025-04-29 NOTE — PROGRESS NOTES
Name: Madelyn Ybarra      : 1955      MRN: [id number]  Encounter Provider: Rajiv Goins MD  Encounter Date: 2025   Encounter department: [facility] [place]  :  Assessment & Plan  Multiple leiomyomata of lung  I have independently obtained history from patient today.  I have reviewed outside hospital reports including evaluations by thoracic team, lung biopsy which demonstrates benign spindle cell neoplasm consistent with leiomyoma, prior CT scans including last CT scan of the chest, abdomen and pelvis dated 2024 from [facility].    Patient has pulmonary leiomyomatosis.  Given her prior/remote history of uterine leiomyoma with multiple myomectomies decades ago and most recent myomectomy and BSO 4 years ago, it is quite likely that she has benign metastasizing leiomyoma syndrome.    Patient follows with Dr. Hull, thoracic surgeon at [facility].  I defer further follow-up and management to his expertise.       Intramural, submucous, and subserous leiomyoma of uterus  Status post multiple prior myomectomies.  I performed myomectomy/excision of broad ligament mass and BSO in .  Most recent CT scan of the abdomen and pelvis in 2024 speaks of a normal size uterus.  Patient is asymptomatic.  Denies vaginal bleeding, drainage, discharge or pressure symptoms.    No further indication for gynecologic intervention.    Interestingly, patient has history of high-grade cervical dysplasia more than 4 years ago.  This was treated with conization.  Subsequently Pap smear had been consistently negative for high-grade dysplasia or malignancy as per her report.  Patient current guidelines and age greater than 65 years she would not benefit from ongoing cervical surveillance.  All questions answered.                   History of Present Illness   Reason for Visit / CC: Longer leiomyoma, history of uterine leiomyomata, here for  evaluation and recommendations.    Madelyn Ybarra is a 70 y.o. female   Patient known to us with history of minimally invasive resection of broad ligament mass/myomectomy as well as below stopping of ectomy 2021.  Recently underwent cardiac CT which demonstrated incidental lung masses.  These were further evaluated with lung biopsy which confirmed benign spindle cell neoplasm consistent with leiomyoma.  Patient has follow-up with thoracic surgery at [facility].  Given her history of uterine leiomyomata she was referred to us for evaluation and management recommendations.  Denies vaginal bleeding, drainage or discharge.  Denies pelvic or abdominal pain.  Reports normal bowel and bladder function.      Pertinent Medical History   Hypertension, history of osteopenia, history of leiomyomata.          Review of Systems A complete review of systems is negative other than that noted above in the HPI.       Objective   There were no vitals taken for this visit.    There is no height or weight on file to calculate BMI.  Pain Screening:     ECOG ECOG Performance Status: 0 - Fully active, able to carry on all pre-disease performance without restriction   Physical Exam   Deferred.    Radiology Results Review : I have reviewed images/report studies in PACS as described above (in the HPI).  Other Study Results Review : Pathology reports reviewed.    Administrative Statements   I have spent a total time of 30 minutes in caring for this patient on the day of the visit/encounter including Diagnostic results, Prognosis, Impressions, and Obtaining or reviewing history  .    Rajiv Goins MD, ARNOLDO, FACOG, FACS  4/29/2025  11:39 AM

## 2025-04-29 NOTE — ASSESSMENT & PLAN NOTE
Status post multiple prior myomectomies.  I performed myomectomy/excision of broad ligament mass and BSO in 2021.  Most recent CT scan of the abdomen and pelvis in October 2024 speaks of a normal size uterus.  Patient is asymptomatic.  Denies vaginal bleeding, drainage, discharge or pressure symptoms.    No further indication for gynecologic intervention.    Interestingly, patient has history of high-grade cervical dysplasia more than 4 years ago.  This was treated with conization.  Subsequently Pap smear had been consistently negative for high-grade dysplasia or malignancy as per her report.  Patient current guidelines and age greater than 65 years she would not benefit from ongoing cervical surveillance.  All questions answered.

## 2025-04-29 NOTE — ASSESSMENT & PLAN NOTE
I have independently obtained history from patient today.  I have reviewed outside hospital reports including evaluations by thoracic team, lung biopsy which demonstrates benign spindle cell neoplasm consistent with leiomyoma, prior CT scans including last CT scan of the chest, abdomen and pelvis dated October 2024 from [facility].    Patient has pulmonary leiomyomatosis.  Given her prior/remote history of uterine leiomyoma with multiple myomectomies decades ago and most recent myomectomy and BSO 4 years ago, it is quite likely that she has benign metastasizing leiomyoma syndrome.    Patient follows with Dr. Hull, thoracic surgeon at [facility].  I defer further follow-up and management to his expertise.

## (undated) DEVICE — SUT STRATAFIX SPIRAL 2-0 CT-1 30 CM SXPP1B410

## (undated) DEVICE — GAUZE SPONGES,16 PLY: Brand: CURITY

## (undated) DEVICE — AIRSEAL TUBE SMOKE EVAC LUMENX3 FILTERED

## (undated) DEVICE — DRAPE EQUIPMENT RF WAND

## (undated) DEVICE — SPONGE STICK WITH PVP-I: Brand: KENDALL

## (undated) DEVICE — ANTI-FOG SOLUTION WITH FOAM PAD: Brand: DEVON

## (undated) DEVICE — 3M™ TEGADERM™ TRANSPARENT FILM DRESSING FRAME STYLE, 1626W, 4 IN X 4-3/4 IN (10 CM X 12 CM), 50/CT 4CT/CASE: Brand: 3M™ TEGADERM™

## (undated) DEVICE — SUT VICRYL 0 UR-6 27 IN J603H

## (undated) DEVICE — BETHLEHEM UNIVERSAL GYN LAP PK: Brand: CARDINAL HEALTH

## (undated) DEVICE — TRAY FOLEY 16FR URIMETER SILICONE SURESTEP

## (undated) DEVICE — ENSEAL LAPAROSCOPIC TISSUE SEALER G2 CURVED JAW FOR USE WITH G2 GENERATOR 5MM DIAMETER 35CM SHAFT LENGTH: Brand: ENSEAL

## (undated) DEVICE — ADHESIVE SKIN HIGH VISCOSITY EXOFIN 1ML

## (undated) DEVICE — GLOVE PI ULTRA TOUCH SZ.8.5

## (undated) DEVICE — ARTHROSCOPY FLOOR MAT

## (undated) DEVICE — SUT VICRYL 2-0 SH 27 IN UNDYED J417H

## (undated) DEVICE — TROCAR: Brand: KII FIOS FIRST ENTRY

## (undated) DEVICE — Device: Brand: TISSUE RETRIEVAL SYSTEM

## (undated) DEVICE — PREMIUM DRY TRAY LF: Brand: MEDLINE INDUSTRIES, INC.

## (undated) DEVICE — GLOVE INDICATOR PI UNDERGLOVE SZ 8.5 BLUE

## (undated) DEVICE — CHLORAPREP HI-LITE 26ML ORANGE

## (undated) DEVICE — LIGHT HANDLE COVER SLEEVE DISP BLUE STELLAR

## (undated) DEVICE — ENDOPATH XCEL BLADELESS TROCARS WITH STABILITY SLEEVES: Brand: ENDOPATH XCEL

## (undated) DEVICE — SYRINGE 10ML LL

## (undated) DEVICE — MAYO STAND COVER: Brand: CONVERTORS

## (undated) DEVICE — INTENDED FOR TISSUE SEPARATION, AND OTHER PROCEDURES THAT REQUIRE A SHARP SURGICAL BLADE TO PUNCTURE OR CUT.: Brand: BARD-PARKER SAFETY BLADES SIZE 11, STERILE

## (undated) DEVICE — CHLORHEXIDINE 4PCT 4 OZ

## (undated) DEVICE — NEEDLE 25GA X 1 IN SAFETY GLIDE

## (undated) DEVICE — VIAL DECANTER

## (undated) DEVICE — SUT MONOCRYL 4-0 PS-2 18 IN Y496G

## (undated) DEVICE — INSUFFLATION TUBING PRIMFLO

## (undated) DEVICE — IRRIG ENDO FLO TUBING